# Patient Record
Sex: FEMALE | Race: OTHER | URBAN - METROPOLITAN AREA
[De-identification: names, ages, dates, MRNs, and addresses within clinical notes are randomized per-mention and may not be internally consistent; named-entity substitution may affect disease eponyms.]

---

## 2020-03-02 ENCOUNTER — APPOINTMENT (RX ONLY)
Dept: URBAN - METROPOLITAN AREA CLINIC 146 | Facility: CLINIC | Age: 20
Setting detail: DERMATOLOGY
End: 2020-03-02

## 2020-03-02 DIAGNOSIS — L80 VITILIGO: ICD-10-CM

## 2020-03-02 PROCEDURE — 99202 OFFICE O/P NEW SF 15 MIN: CPT

## 2020-03-02 PROCEDURE — ? PRESCRIPTION

## 2020-03-02 PROCEDURE — ? ADDITIONAL NOTES

## 2020-03-02 PROCEDURE — ? COUNSELING

## 2020-03-02 PROCEDURE — ? FULL BODY SKIN EXAM - DECLINED

## 2020-03-02 RX ORDER — TACROLIMUS 1 MG/G
OINTMENT TOPICAL
Qty: 1 | Refills: 3 | Status: ERX | COMMUNITY
Start: 2020-03-02

## 2020-03-02 RX ORDER — TRIAMCINOLONE ACETONIDE 1 MG/G
CREAM TOPICAL BID
Qty: 1 | Refills: 3 | Status: ERX | COMMUNITY
Start: 2020-03-02

## 2020-03-02 RX ADMIN — TACROLIMUS: 1 OINTMENT TOPICAL at 00:00

## 2020-03-02 RX ADMIN — TRIAMCINOLONE ACETONIDE: 1 CREAM TOPICAL at 00:00

## 2020-03-02 ASSESSMENT — LOCATION SIMPLE DESCRIPTION DERM
LOCATION SIMPLE: RIGHT PRETIBIAL REGION
LOCATION SIMPLE: RIGHT ANKLE
LOCATION SIMPLE: LEFT ANKLE
LOCATION SIMPLE: GROIN

## 2020-03-02 ASSESSMENT — LOCATION DETAILED DESCRIPTION DERM
LOCATION DETAILED: RIGHT DISTAL PRETIBIAL REGION
LOCATION DETAILED: RIGHT ANKLE
LOCATION DETAILED: LEFT ANKLE
LOCATION DETAILED: MONS PUBIS

## 2020-03-02 ASSESSMENT — LOCATION ZONE DERM
LOCATION ZONE: LEG
LOCATION ZONE: VULVA

## 2020-03-02 NOTE — PROCEDURE: ADDITIONAL NOTES
Additional Notes: Areas on leg stable (has had since 5 and biopsy proven, had xtrac treatment with mild improvement) \\n\\nNew lesion on groin and left breast, rx given to treat areas \\n\\nMade aware to call clinic with any issues obtaining meds
Detail Level: Simple

## 2020-03-12 ENCOUNTER — RX ONLY (OUTPATIENT)
Age: 20
Setting detail: RX ONLY
End: 2020-03-12

## 2020-03-12 RX ORDER — TACROLIMUS 1 MG/G
OINTMENT TOPICAL
Qty: 1 | Refills: 3 | Status: ERX